# Patient Record
Sex: FEMALE | Race: WHITE | URBAN - METROPOLITAN AREA
[De-identification: names, ages, dates, MRNs, and addresses within clinical notes are randomized per-mention and may not be internally consistent; named-entity substitution may affect disease eponyms.]

---

## 2018-08-30 ENCOUNTER — HISTORICAL (OUTPATIENT)
Dept: ADMINISTRATIVE | Facility: HOSPITAL | Age: 53
End: 2018-08-30

## 2022-04-30 NOTE — ED PROVIDER NOTES
Patient:   Rhianna Washington            MRN: [id number]            FIN: [id number]               Age:   53 years     Sex:  Female     :  1965   Associated Diagnoses:   Left flank pain; Acute UTI   Author:   Juan Cormier      Basic Information   Time seen: Date & time 2018 15:43:00.   History source: Patient.   History limitation: None.   Additional information: Chief Complaint from Nursing Triage Note : Chief Complaint   2018 15:36 CDT      Chief Complaint           left flank pain with confirmed 6 retained renal stones onset 11am, pt had a procedure in May but unable removed the stones, denies blood in urine  .      History of Present Illness   The patient presents with 52 yo F who presents with CC of flank pain, known hx of stones.  .  The onset was 2  weeks ago.  The course/duration of symptoms is worsening and fluctuating in intensity.  The character of symptoms is sharp.  The degree at onset was moderate.  The Location of pain at onset was left and flank.  The degree at present is moderate.  The Location of pain at present is left and flank.  Radiating pain: none. The exacerbating factor is none.  The relieving factor is none.  Therapy today: none.  Risk factors consist of none.  Associated symptoms: nausea.        Review of Systems   Constitutional symptoms:  No fever, no chills.    Respiratory symptoms:  No shortness of breath, no cough.    Cardiovascular symptoms:  No chest pain, no palpitations.    Gastrointestinal symptoms:  Left flank, sharp, no vomiting, no diarrhea.    Musculoskeletal symptoms:  No back pain,    Neurologic symptoms:  No altered level of consciousness, no weakness.              Additional review of systems information: All other systems reviewed and otherwise negative.      Health Status   Allergies:    Allergic Reactions (Selected)  No Known Medication Allergies.   Medications:  (Selected)   Prescriptions  Prescribed  Flomax 0.4 mg oral capsule: 0.4 mg  = 1 cap(s), Oral, Daily, # 7 cap(s), 0 Refill(s)  Norco 7.5 mg-325 mg oral tablet: 1 tab(s), Oral, q4hr, PRN PRN for pain, # 10 tab(s), 0 Refill(s)  Documented Medications  Documented  Ambien 5 mg oral tablet: 10 mg = 2 tab(s), Oral, Once a day (at bedtime), PRN PRN for sleep, 0 Refill(s)  CITALOPRAM HYDROBROMIDE 40 MG TABS: 40 mg = 1 tab(s), Oral, Daily  CeleXA 40 mg oral tablet: 40 mg = 1 tab(s), Oral, Daily, # 30 tab(s), 0 Refill(s)  ESTROPIPATE 1.5 MG TABS:   HYDROCHLOROTHIAZIDE 25 MG TABS: 25 mg = 1 tab(s), Oral, Daily  LISINOPRIL 5 MG TABS: 5 mg = 1 tab(s), Oral, Daily  LOVASTATIN 10 MG TABS: 10 mg = 1 tab(s), Oral, Daily  NAPROXEN 500 MG TABS: 500 mg = 1 tab(s), Oral, BID  Ogen 1.25: 0 Refill(s)  PANTOPRAZOLE SODIUM 40 MG TBEC:   Protonix 40 mg ORAL enteric coated tablet: 40 mg = 1 tab(s), Oral, BID, # 60 tab(s), 0 Refill(s)  ZOLPIDEM TARTRATE 10 MG TABS: 10 mg = 1 tab(s), Oral, qPM  estropipate 1.5 mg oral tablet: 1.5 mg = 1 tab(s), Oral, Daily, # 30 tab(s), 0 Refill(s)  hydrochlorothiazide: 25, Oral, Daily, 0 Refill(s)  lisinopril 1 mg/mL oral solution: 5 mg = 5 mL, Oral, Daily, # 150 mL, 0 Refill(s)  lovastatin 10 mg oral tablet: 10 mg = 1 tab(s), Oral, Daily, # 30 tab(s), 0 Refill(s)  ranitidine: 300, Oral, Daily, 0 Refill(s), per nurse's notes.   Immunizations: Per nurse's notes.   Menstrual history: Per nurse's notes.      Past Medical/ Family/ Social History   Medical history:    Active  Hypercholesterolaemia (581685714)  Resolved  Kidney stone (319080563):  Resolved.  Strep throat (148163526):  Resolved.  HTN - Hypertension (4886311379):  Resolved.  Hyperlipidemia (95647277):  Resolved.  GERD - Gastro-esophageal reflux disease (1666192334):  Resolved.  Insomnia (967555122):  Resolved.  Depression (841993147):  Resolved.  Chronic back pain (224881360):  Resolved.  Hyperlipidemia (78814280):  Resolved.  Hypertension (44088624):  Resolved., Reviewed as documented in chart.   Surgical history:     Hysterectomy (492023077).  lumbar fusion.  cervical fusion.  Cholecystectomy (78181508).  Lithotripsy (404653526).  j-stent., Reviewed as documented in chart.   Family history:    No family history items have been selected or recorded., Reviewed as documented in chart.   Social history: Reviewed as documented in chart.   Problem list: Per nurse's notes.      Physical Examination   General:  Alert, no acute distress, well hydrated, Skin: Normal for ethnicity.    Skin:  Warm, dry, pink, intact.    Head:  Normocephalic.   Neck:  Supple, no tenderness, full range of motion.    Eye:  Pupils are equal, round and reactive to light, extraocular movements are intact, normal conjunctiva.    Ears, nose, mouth and throat:  Oral mucosa moist.   Cardiovascular:  Regular rate and rhythm, No murmur, Normal peripheral perfusion.    Respiratory:  Lungs are clear to auscultation, breath sounds are equal, Respirations: not tachypneic, not labored, not shallow, Retractions: None.    Chest wall:  No tenderness.   Back:  Normal range of motion, Normal alignment, No costovertebral angle tenderness,    Musculoskeletal:  Normal ROM, normal strength, no swelling, no deformity.    Gastrointestinal:  Soft, Nontender, Non distended, Normal bowel sounds.    Neurological:  Alert and oriented to person, place, time, and situation, No focal neurological deficit observed, normal sensory observed, normal motor observed, normal speech observed, normal coordination observed, Gait: Normal.    Psychiatric:  Cooperative, appropriate mood & affect, normal judgment.       Medical Decision Making   Documents reviewed:  Emergency department nurses' notes.   Orders  Launch Orders   Laboratory:  Urinalysis Complete a reflex to culture (Order): Stat collect, Urine, 8/30/2018 15:44 CDT, Nurse collect, Print Label By Order Location  CMP (Order): Stat collect, 8/30/2018 15:44 CDT, Blood, Lab Collect, Print Label By Order Location, 8/30/2018 15:44 CDT  CBC w/ Auto  Diff (Order): Now collect, 8/30/2018 15:44 CDT, Blood, Lab Collect, Print Label By Order Location, 8/30/2018 15:44 CDT  Pharmacy:  Toradol 30 mg for IV Push (Order): 30 mg, form: Injection, IV Push, Once, first dose 8/30/2018 15:44 CDT, stop date 8/30/2018 15:44 CDT, STAT  Sodium Chloride 0.9% intravenous solution (Normal Saline Infusion) (Order): 1,000 mL, 1,000 mL, IV, 1,000 mL/hr, start date 8/30/2018 15:44 CDT, stop date 8/30/2018 15:44 CDT, Launch Orders   Radiology:  CT Stone W/O Contrast (Order): Stat, 8/30/2018 19:05 CDT, Abdominal Pain, None, Stretcher, Patient Has IV?, Rad Type, Schedule this test, Launch Orders   Pharmacy:  Rocephin (for IVPB) (Order): 1,000 mg, IV Piggyback, Once, Infuse over: 30 minute(s), first dose 8/30/2018 20:00 CDT, stop date 8/30/2018 20:00 CDT.    Results review:  Lab results : Lab View   8/30/2018 19:10 CDT      UA Appear                 CLEAR                             UA Color                  YELLOW                             UA Spec Grav              1.022                             UA Bili                   Negative                             UA pH                     5.5                             UA Urobilinogen           0.2                             UA Blood                  Negative                             UA Glucose                Negative                             UA Ketones                Negative                             UA Protein                Trace                             UA Nitrite                Positive                             UA Leuk Est               1+                             UA WBC                    37 /HPF  HI                             UA RBC                    NONE SEEN                             UA Bacteria               4+ /HPF                             UA Squam Epithelial       NONE SEEN    8/30/2018 15:52 CDT      Sodium Lvl                138 mmol/L                             Potassium Lvl             4.2 mmol/L                              Chloride                  101 mmol/L                             CO2                       29.0 mmol/L                             Calcium Lvl               9.5 mg/dL                             Glucose Lvl               99 mg/dL                             BUN                       23.0 mg/dL  HI                             Creatinine                0.68 mg/dL                             eGFR-AA                   >60 mL/min/1.73 m2  NA                             eGFR-REGINALDO                  >60 mL/min/1.73 m2  NA                             Bili Total                0.3 mg/dL                             Bili Direct               0.10 mg/dL                             Bili Indirect             0.20 mg/dL                             AST                       12 unit/L  LOW                             ALT                       14 unit/L                             Alk Phos                  79 unit/L                             Total Protein             7.4 gm/dL                             Albumin Lvl               3.60 gm/dL                             Globulin                  3.80 gm/dL  HI                             A/G Ratio                 0.9  NA                             WBC                       6.8 x10(3)/mcL                             RBC                       4.23 x10(6)/mcL                             Hgb                       12.9 gm/dL                             Hct                       39.0 %                             Platelet                  387 x10(3)/mcL                             MCV                       92.2 fL                             MCH                       30.5 pg                             MCHC                      33.1 gm/dL                             RDW                       11.2 %  LOW                             MPV                       9.6 fL                             Abs Neut                  3.55 x10(3)/mcL                              Neutro Auto               52 %  NA                             Lymph Auto                35 %  NA                             Mono Auto                 10 %  NA                             Eos Auto                  1 %  NA                             Abs Eos                   0.1 x10(3)/mcL                             Basophil Auto             1 %  NA                             Abs Neutro                3.55 x10(3)/mcL                             Abs Lymph                 2.4 x10(3)/mcL                             Abs Mono                  0.7 x10(3)/mcL                             Abs Baso                  0.0 x10(3)/mcL    8/30/2018 15:45 CDT      UA Appear                 CLOUDY                             UA Color                  YELLOW                             UA Spec Grav              1.021                             UA Bili                   Negative                             UA pH                     7.0                             UA Urobilinogen           0.2                             UA Blood                  Negative                             UA Glucose                Negative                             UA Ketones                Negative                             UA Protein                Negative                             UA Nitrite                Negative                             UA Leuk Est               1+                             UA WBC                    12 /HPF  HI                             UA RBC                    NONE SEEN                             UA Bacteria               4+ /HPF                             UA Squam Epithelial       9 /HPF  HI  .   Radiology results:  Rad Results (ST)  < 12 hrs   Accession: [id number]  Order: CT Abdomen and Pelvis W/O Contrast  Report Dt/Tm: 08/30/2018 19:40  Report:   Clinical History:  Abdominal Pain     Technique:  Multidetector non-contrast axial CT images of the abdomen and pelvis  were obtained with coronal and sagittal  reconstructions.      Automatic exposure control was utilized to reduce the patient's  radiation dose.     Comparison:  No prior imaging available for comparison.     Findings:     01. HEPATOBILIARY: No focal hepatic lesion is identified, however  evaluation is limited secondary to lack of IV contrast. Status post  cholecystectomy.     02. SPLEEN: Normal     03. PANCREAS: No focal masses or ductal dilatation.     04. ADRENALS: No adrenal nodules.     05. KIDNEYS: The right kidney demonstrates no obstructing stone,  hydronephrosis, or hydroureter. No focal mass identified.The left  kidney demonstrates no obstructing stone, hydronephrosis, or  hydroureter. No focal mass identified.     06. LYMPHADENOPATHY/RETROPERITONEUM: There is no retroperitoneal  lymphadenopathy. The abdominal aorta is normal in course and caliber.      07. BOWEL: No acute bowel related abnormalities. The appendix is not  clearly identified however there are no inflammatory changes of the  right lower quadrant.     08. PELVIC VISCERA: Status post hysterectomy. The bladder is  decompressed.     09. PELVIC LYMPH NODES: No lymphadenopathy.     10. PERITONEUM/ABDOMINAL WALL: No ascites or implant.     11. SKELETAL: No aggressive appearing lytic/blastic lesion. No acute  fractures, subluxations or dislocations. Postsurgical changes of the  lower lumbar spine. No focal stenosis identified.     12. LUNG BASES: The visualized lungs are unremarkable.     Impression  No acute abnormality identified within the abdomen and pelvis.     Multiple left-sided renal stones are noted with no obstructing stones  or evidence of obstruction identified. The largest left-sided stone  measures approximately 3 mm. Approximately 8 stones are identified.      .      Impression and Plan   Diagnosis   Left flank pain (XJC35-ZA R10.9)   Acute UTI (TLA91-DG N39.0)   Plan   Condition: Stable.    Disposition: Discharged: Time  8/30/2018 19:56:00, to home.    Prescriptions: Launch  prescriptions   Pharmacy:  Ceftin 250 mg oral tablet (Prescribe): 250 mg = 1 tab(s), Oral, BID, X 14 day(s), # 28 tab(s), 0 Refill(s)  Norco 7.5 mg-325 mg oral tablet (Prescribe): 1 tab(s), Oral, q6hr, PRN PRN for pain, X 4 day(s), # 16 tab(s), 0 Refill(s).    Patient was given the following educational materials: Flank Pain, Adult, Urinary Tract Infection, Adult, Urinary Tract Infection, Adult, Flank Pain, Adult.    Follow up with: Follow up with primary care provider Within 2 to 4 days; Report to Emergency Department if symptoms return or worsen.    Counseled: Patient, Regarding diagnosis, Regarding diagnostic results, Regarding treatment plan, Regarding prescription, Patient indicated understanding of instructions.